# Patient Record
Sex: MALE | Race: WHITE | NOT HISPANIC OR LATINO | Employment: FULL TIME | URBAN - METROPOLITAN AREA
[De-identification: names, ages, dates, MRNs, and addresses within clinical notes are randomized per-mention and may not be internally consistent; named-entity substitution may affect disease eponyms.]

---

## 2019-07-28 RX ORDER — PREDNISONE 10 MG/1
TABLET ORAL
Qty: 20 TABLET | Refills: 0 | Status: SHIPPED | OUTPATIENT
Start: 2019-07-28 | End: 2019-08-05

## 2019-07-28 RX ORDER — CLOBETASOL PROPIONATE 0.5 MG/G
CREAM TOPICAL
Qty: 60 G | Refills: 1 | Status: SHIPPED | OUTPATIENT
Start: 2019-07-28 | End: 2023-01-26

## 2019-08-02 ENCOUNTER — OFFICE VISIT (OUTPATIENT)
Dept: FAMILY MEDICINE CLINIC | Facility: CLINIC | Age: 24
End: 2019-08-02

## 2019-08-02 VITALS
HEART RATE: 49 BPM | TEMPERATURE: 97.6 F | RESPIRATION RATE: 20 BRPM | SYSTOLIC BLOOD PRESSURE: 108 MMHG | BODY MASS INDEX: 22.26 KG/M2 | OXYGEN SATURATION: 98 % | DIASTOLIC BLOOD PRESSURE: 64 MMHG | WEIGHT: 155.5 LBS | HEIGHT: 70 IN

## 2019-08-02 DIAGNOSIS — Z83.2 FAMILY HISTORY OF CLOTTING DISORDER: Primary | ICD-10-CM

## 2019-08-02 DIAGNOSIS — L30.9 DERMATITIS: ICD-10-CM

## 2019-08-02 PROCEDURE — 99395 PREV VISIT EST AGE 18-39: CPT | Performed by: NURSE PRACTITIONER

## 2019-08-02 NOTE — PATIENT INSTRUCTIONS
Follow up on labs.   Continue benadryl for hives  May consider allergy testing  Can take zyrtec daily  Get flu shot.

## 2019-08-02 NOTE — PROGRESS NOTES
"Subjective   Price Borjas is a 24 y.o. male.     Chief Complaint   Patient presents with   • Annual Exam     [facility] insurance physical       HPI  He is here for his yearly physical exam.  He was recently treated for dermatitis vs allergic reaction to chemicals on his hands that responed to steroids and steroid cream. He took 3 days then developed a rash on his legs that appeared to be hives.   He said still has rash on legs that itches but otherwise is better.  He has concerns because his mother was diagnosed with clotting disorder and was recommended he be tested. Reviewed his mother's chart: she is factor V Leiden homozygosity , Low protein S and anticariolipin antibody positive.     The following portions of the patient's history were reviewed and updated as appropriate: allergies, current medications, past family history, past medical history, past social history, past surgical history and problem list.      Current Outpatient Medications:   •  clobetasol prop emollient base (TEMOVATE) 0.05 % emollient cream, Apply sparingly to hands nightly for 2 weeks then stop, Disp: 60 g, Rfl: 1  •  predniSONE (DELTASONE) 10 MG tablet, Take 4 tablets by mouth Daily for 2 days, THEN 3 tablets Daily for 2 days, THEN 2 tablets Daily for 2 days, THEN 1 tablet Daily for 2 days., Disp: 20 tablet, Rfl: 0    No results found for this or any previous visit (from the past 4032 hour(s)).      Review of Systems   Skin: Positive for rash.        Hands.   Gets hives has one his legs right now       Objective     /64 (BP Location: Left arm, Patient Position: Sitting, Cuff Size: Adult)   Pulse (!) 49   Temp 97.6 °F (36.4 °C) (Oral)   Resp 20   Ht 177.8 cm (70\")   Wt 70.5 kg (155 lb 8 oz)   SpO2 98%   BMI 22.31 kg/m²     Physical Exam   Constitutional: He is oriented to person, place, and time. He appears well-developed and well-nourished.   HENT:   Head: Normocephalic and atraumatic.   Right Ear: External ear normal.   Left Ear: " External ear normal.   Nose: Nose normal.   Mouth/Throat: Oropharynx is clear and moist.   Eyes: Conjunctivae and EOM are normal. Pupils are equal, round, and reactive to light.   Neck: Normal range of motion.   Cardiovascular: Normal rate, regular rhythm, normal heart sounds and intact distal pulses.   Pulmonary/Chest: Effort normal and breath sounds normal.   Abdominal: Soft. Bowel sounds are normal.   Neurological: He is alert and oriented to person, place, and time.   Skin: Skin is warm and dry.   Psychiatric: He has a normal mood and affect. His behavior is normal. Judgment and thought content normal.   Vitals reviewed.        Assessment/Plan   Price was seen today for annual exam.    Diagnoses and all orders for this visit:    Family history of clotting disorder  -     Factor 5 Leiden; Future  -     Protein S Functional; Future  -     Protein C Activity; Future  -     Factor II, DNA Analysis; Future  -     Von Willebrand Screen; Future  -     CBC & Differential; Future  -     Comprehensive Metabolic Panel; Future      Patient Instructions   Follow up on labs.   Continue benadryl for hives  May consider allergy testing  Can take zyrtec daily  Get flu shot.       Leydi Kim, APRN    08/02/19

## 2019-08-12 LAB
ALBUMIN SERPL-MCNC: 4.9 G/DL (ref 3.5–5.5)
ALBUMIN/GLOB SERPL: 2.5 {RATIO} (ref 1.2–2.2)
ALP SERPL-CCNC: 56 IU/L (ref 39–117)
ALT SERPL-CCNC: 41 IU/L (ref 0–44)
AMBIG ABBREV CMP14 DEFAULT: NORMAL
AST SERPL-CCNC: 42 IU/L (ref 0–40)
BASOPHILS # BLD AUTO: 0 X10E3/UL (ref 0–0.2)
BASOPHILS NFR BLD AUTO: 1 %
BILIRUB SERPL-MCNC: 0.5 MG/DL (ref 0–1.2)
BUN SERPL-MCNC: 15 MG/DL (ref 6–20)
BUN/CREAT SERPL: 14 (ref 9–20)
CALCIUM SERPL-MCNC: 9.6 MG/DL (ref 8.7–10.2)
CHLORIDE SERPL-SCNC: 103 MMOL/L (ref 96–106)
CO2 SERPL-SCNC: 25 MMOL/L (ref 20–29)
CREAT SERPL-MCNC: 1.04 MG/DL (ref 0.76–1.27)
EOSINOPHIL # BLD AUTO: 0.1 X10E3/UL (ref 0–0.4)
EOSINOPHIL NFR BLD AUTO: 1 %
ERYTHROCYTE [DISTWIDTH] IN BLOOD BY AUTOMATED COUNT: 12.9 % (ref 12.3–15.4)
F2 GENE MUT ANL BLD/T: NORMAL
F5 GENE MUT ANL BLD/T: ABNORMAL
FACT VIII ACT/NOR PPP: 116 % (ref 56–140)
GLOBULIN SER CALC-MCNC: 2 G/DL (ref 1.5–4.5)
GLUCOSE SERPL-MCNC: 94 MG/DL (ref 65–99)
HCT VFR BLD AUTO: 42.1 % (ref 37.5–51)
HGB BLD-MCNC: 14.7 G/DL (ref 13–17.7)
IMM GRANULOCYTES # BLD AUTO: 0 X10E3/UL (ref 0–0.1)
IMM GRANULOCYTES NFR BLD AUTO: 0 %
LYMPHOCYTES # BLD AUTO: 1.2 X10E3/UL (ref 0.7–3.1)
LYMPHOCYTES NFR BLD AUTO: 28 %
MCH RBC QN AUTO: 30.2 PG (ref 26.6–33)
MCHC RBC AUTO-ENTMCNC: 34.9 G/DL (ref 31.5–35.7)
MCV RBC AUTO: 87 FL (ref 79–97)
MONOCYTES # BLD AUTO: 0.5 X10E3/UL (ref 0.1–0.9)
MONOCYTES NFR BLD AUTO: 12 %
NEUTROPHILS # BLD AUTO: 2.4 X10E3/UL (ref 1.4–7)
NEUTROPHILS NFR BLD AUTO: 58 %
PATH INTERP BLD-IMP: NORMAL
PLATELET # BLD AUTO: 182 X10E3/UL (ref 150–450)
POTASSIUM SERPL-SCNC: 4.2 MMOL/L (ref 3.5–5.2)
PROT C ACT/NOR PPP CHRO: 140 %
PROT S ACT/NOR PPP: 65 % (ref 63–140)
PROT SERPL-MCNC: 6.9 G/DL (ref 6–8.5)
RBC # BLD AUTO: 4.86 X10E6/UL (ref 4.14–5.8)
SODIUM SERPL-SCNC: 143 MMOL/L (ref 134–144)
VWF AG ACT/NOR PPP IA: 163 % (ref 50–200)
VWF:RCO ACT/NOR PPP PL AGG: 143 % (ref 50–200)
WBC # BLD AUTO: 4.2 X10E3/UL (ref 3.4–10.8)

## 2019-08-13 ENCOUNTER — TELEPHONE (OUTPATIENT)
Dept: FAMILY MEDICINE CLINIC | Facility: CLINIC | Age: 24
End: 2019-08-13

## 2019-08-13 DIAGNOSIS — D68.51 FACTOR 5 LEIDEN MUTATION, HETEROZYGOUS (HCC): Primary | ICD-10-CM

## 2019-08-13 NOTE — TELEPHONE ENCOUNTER
Contacted patient with his lab results . Referred to hematologist for further evaluation of Factor 5 Leiden heterozygote

## 2019-09-16 PROBLEM — D68.51 FACTOR 5 LEIDEN MUTATION, HETEROZYGOUS: Status: ACTIVE | Noted: 2019-09-16

## 2019-09-16 NOTE — PROGRESS NOTES
Hematology/Oncology Outpatient Consultation    Patient name: Price Borjas  : 1995  MRN: [id number]  Primary Care Physician: Leydi Kim APRN  Referring Physician: Leydi Kim APRN  Reason For Consult: Factor V Leiden mutation, heterozygous     Chief Complaint   Patient presents with   • Appointment     for factor V Leiden mutation, heterozygous      Padma Curtis DEONTE present during office visit.      History of Present Illness:  This patient is a 24-year-old  male whose mother after hearing being developed lower extremity DVT and SVT was diagnosed with factor V Leiden heterozygosity, low protein S level and anticardiolipin antibody positivity which has since resolved.  She was asked to have her family members tested and the patient underwent evaluation by his PCP MILDRED Marie on 2019 when blood testing was performed revealing a normal CBC.  CMP was significant for AST 42 (0-40), ALT 41 (0-44).  Factor VIII activity 116% (), von Willebrand antigen 163% (), VWF activity 143 (), protein S functional 65 (), protein C activity 140 (), factor II DNA analysis with no mutation identified and factor V Leiden with a single R506Q mutation identified (heterozygous).  The patient was then referred here for further evaluation and today is denying ever have had any blood clots himself.  He is not a smoker and has not had any major surgical procedures performed or been on prolonged bedrest.    History reviewed. No pertinent past medical history.    Past Surgical History:   Procedure Laterality Date   • WRIST FRACTURE SURGERY Left 2018         Current Outpatient Medications:   •  clobetasol prop emollient base (TEMOVATE) 0.05 % emollient cream, Apply sparingly to hands nightly for 2 weeks then stop, Disp: 60 g, Rfl: 1    No Known Allergies      There is no immunization history on file for this patient.    Family History   Problem Relation Age of Onset   •  "Factor V Leiden deficiency Mother    • Cancer Paternal Grandmother 60        Multiple myeloma        Cancer-related family history includes Cancer (age of onset: 60) in his paternal grandmother.    Social History     Tobacco Use   • Smoking status: Never Smoker   • Smokeless tobacco: Never Used   Substance Use Topics   • Alcohol use: Yes     Frequency: 2-3 times a week     Drinks per session: 1 or 2     Binge frequency: Less than monthly     Comment: socially   • Drug use: No       ROS:    Review of Systems   Constitutional: Negative for chills and fever.   HENT: Negative for ear pain, mouth sores, nosebleeds and sore throat.    Eyes: Negative for photophobia and visual disturbance.   Respiratory: Negative for wheezing and stridor.    Cardiovascular: Negative for chest pain and palpitations.   Gastrointestinal: Negative for abdominal pain, diarrhea, nausea and vomiting.   Endocrine: Negative for cold intolerance and heat intolerance.   Genitourinary: Negative for dysuria and hematuria.   Musculoskeletal: Negative for joint swelling and neck stiffness.   Skin: Negative for color change and rash.   Neurological: Negative for seizures and syncope.   Hematological: Negative for adenopathy.        No obvious bleeding   Psychiatric/Behavioral: Negative for agitation, confusion and hallucinations.       Objective:    Vitals:    09/18/19 1258   BP: 118/62   Pulse: (!) 42  Comment: triple checked.   Resp: 18   Temp: 98.3 °F (36.8 °C)   Weight: 72.4 kg (159 lb 9.6 oz)   Height: 177.8 cm (70\")   PainSc: 0-No pain       ECOG  (0) Fully active, able to carry on all predisease performance without restriction    Physical Exam:    Physical Exam   Constitutional: He is oriented to person, place, and time. No distress.   HENT:   Head: Normocephalic and atraumatic.   Dental fillings. Tympanic membranes: cerumen bilaterally.    Eyes: Conjunctivae and EOM are normal. Right eye exhibits no discharge. Left eye exhibits no discharge. No " scleral icterus.   Neck: Normal range of motion. Neck supple. No thyromegaly present.   Cardiovascular: Normal rate, regular rhythm and normal heart sounds. Exam reveals no gallop and no friction rub.   Pulmonary/Chest: Effort normal. No stridor. No respiratory distress. He has no wheezes.   Abdominal: Soft. Bowel sounds are normal. He exhibits no mass. There is no tenderness. There is no rebound and no guarding.   Musculoskeletal: Normal range of motion. He exhibits no tenderness.   Lymphadenopathy:     He has no cervical adenopathy.   Neurological: He is alert and oriented to person, place, and time. He exhibits normal muscle tone.   Skin: Skin is warm. No rash noted. He is not diaphoretic. No erythema.   Psychiatric: He has a normal mood and affect. His behavior is normal.   Nursing note and vitals reviewed.      RECENT LABS  WBC   Date Value Ref Range Status   08/06/2019 4.2 3.4 - 10.8 x10E3/uL Final     RBC   Date Value Ref Range Status   08/06/2019 4.86 4.14 - 5.80 x10E6/uL Final     Hemoglobin   Date Value Ref Range Status   08/06/2019 14.7 13.0 - 17.7 g/dL Final     Hematocrit   Date Value Ref Range Status   08/06/2019 42.1 37.5 - 51.0 % Final     MCV   Date Value Ref Range Status   08/06/2019 87 79 - 97 fL Final     MCH   Date Value Ref Range Status   08/06/2019 30.2 26.6 - 33.0 pg Final     MCHC   Date Value Ref Range Status   08/06/2019 34.9 31.5 - 35.7 g/dL Final     RDW   Date Value Ref Range Status   08/06/2019 12.9 12.3 - 15.4 % Final     Platelets   Date Value Ref Range Status   08/06/2019 182 150 - 450 x10E3/uL Final     Neutrophil Rel %   Date Value Ref Range Status   08/06/2019 58 Not Estab. % Final     Lymphocyte Rel %   Date Value Ref Range Status   08/06/2019 28 Not Estab. % Final     Monocyte Rel %   Date Value Ref Range Status   08/06/2019 12 Not Estab. % Final     Eosinophil Rel %   Date Value Ref Range Status   08/06/2019 1 Not Estab. % Final     Basophil Rel %   Date Value Ref Range Status    08/06/2019 1 Not Estab. % Final     Neutrophils Absolute   Date Value Ref Range Status   08/06/2019 2.4 1.4 - 7.0 x10E3/uL Final     Lymphocytes Absolute   Date Value Ref Range Status   08/06/2019 1.2 0.7 - 3.1 x10E3/uL Final     Monocytes Absolute   Date Value Ref Range Status   08/06/2019 0.5 0.1 - 0.9 x10E3/uL Final     Eosinophils Absolute   Date Value Ref Range Status   08/06/2019 0.1 0.0 - 0.4 x10E3/uL Final     Basophils Absolute   Date Value Ref Range Status   08/06/2019 0.0 0.0 - 0.2 x10E3/uL Final       Lab Results   Component Value Date    BUN 15 08/06/2019    CREATININE 1.04 08/06/2019    EGFRIFNONA 100 08/06/2019    EGFRIFAFRI 116 08/06/2019    BCR 14 08/06/2019    K 4.2 08/06/2019    CO2 25 08/06/2019    CALCIUM 9.6 08/06/2019    PROTENTOTREF 6.9 08/06/2019    ALBUMIN 4.9 08/06/2019    LABIL2 2.5 (H) 08/06/2019    AST 42 (H) 08/06/2019    ALT 41 08/06/2019         Assessment/Plan     Factor 5 Leiden mutation, heterozygous (CMS/HCC)  - [facility] Requisition ([facility] Only)  - CBC & Differential    Elevated LFTs  - Comprehensive Metabolic Panel  In summary this is a patient with family history of factor V Leiden homozygosity and low protein S level who himself is heterozygous for the Factor 5 Leiden and has borderline protein S level.  His mother also has a history of antiphospholipid antibody positivity.  I have discussed this in detail with him and have told him that I would like to complete his thrombophilia work-up by checking Antithrombin III, lupus anticoagulant, antiphospholipid antibodies and repeating protein S level.  He will likely benefit from being on treatment with aspirin depending on further work-up results and I have made him aware of increased risks of clotting with prolonged bedrest or smoking.  He did have elevated liver enzymes a month ago and I will be repeating those to see if they need any further work-up.        I have reviewed lab results, imaging, vitals, and medications with the  patient today. Will follow up in 1 month.    Patient verbalized understanding and is in agreement of the above plan.    I have reviewed and agree with the above information.   Dilip Arias M.D., F.A.C.P.      Much of the above report is an electronic transcription/translation of the spoken language to printed text using Dragon Software. As such, the subtleties and finesse of the spoken language may permit erroneous, or at times, nonsensical words or phrases to be inadvertently transcribed; thus changes may be made at a later date to rectify these errors.

## 2019-09-18 ENCOUNTER — RESULTS ENCOUNTER (OUTPATIENT)
Dept: ONCOLOGY | Facility: CLINIC | Age: 24
End: 2019-09-18

## 2019-09-18 ENCOUNTER — CONSULT (OUTPATIENT)
Dept: ONCOLOGY | Facility: CLINIC | Age: 24
End: 2019-09-18

## 2019-09-18 ENCOUNTER — APPOINTMENT (OUTPATIENT)
Dept: LAB | Facility: HOSPITAL | Age: 24
End: 2019-09-18

## 2019-09-18 VITALS
HEIGHT: 70 IN | WEIGHT: 159.6 LBS | DIASTOLIC BLOOD PRESSURE: 62 MMHG | BODY MASS INDEX: 22.85 KG/M2 | TEMPERATURE: 98.3 F | SYSTOLIC BLOOD PRESSURE: 118 MMHG | RESPIRATION RATE: 18 BRPM | HEART RATE: 42 BPM

## 2019-09-18 DIAGNOSIS — D68.51 FACTOR 5 LEIDEN MUTATION, HETEROZYGOUS (HCC): Primary | ICD-10-CM

## 2019-09-18 DIAGNOSIS — R79.89 ELEVATED LFTS: ICD-10-CM

## 2019-09-18 DIAGNOSIS — D68.51 FACTOR 5 LEIDEN MUTATION, HETEROZYGOUS (HCC): ICD-10-CM

## 2019-09-18 LAB
ALBUMIN SERPL-MCNC: 4.9 G/DL (ref 3.5–4.8)
ALBUMIN/GLOB SERPL: 2 G/DL (ref 1–1.7)
ALP SERPL-CCNC: 50 U/L (ref 32–91)
ALT SERPL W P-5'-P-CCNC: 45 U/L (ref 17–63)
ANION GAP SERPL CALCULATED.3IONS-SCNC: 17.8 MMOL/L (ref 5–15)
AST SERPL-CCNC: 47 U/L (ref 15–41)
BASOPHILS # BLD AUTO: 0.01 10*3/MM3 (ref 0–0.2)
BASOPHILS NFR BLD AUTO: 0.2 % (ref 0–1.5)
BILIRUB SERPL-MCNC: 1.1 MG/DL (ref 0.3–1.2)
BUN BLD-MCNC: 9 MG/DL (ref 8–20)
BUN/CREAT SERPL: 9 (ref 6.2–20.3)
CALCIUM SPEC-SCNC: 9.5 MG/DL (ref 8.9–10.3)
CHLORIDE SERPL-SCNC: 97 MMOL/L (ref 101–111)
CO2 SERPL-SCNC: 27 MMOL/L (ref 22–32)
CREAT BLD-MCNC: 1 MG/DL (ref 0.7–1.2)
DEPRECATED RDW RBC AUTO: 38.9 FL (ref 37–54)
EOSINOPHIL # BLD AUTO: 0.07 10*3/MM3 (ref 0–0.4)
EOSINOPHIL NFR BLD AUTO: 1.1 % (ref 0.3–6.2)
ERYTHROCYTE [DISTWIDTH] IN BLOOD BY AUTOMATED COUNT: 12.6 % (ref 12.3–15.4)
GFR SERPL CREATININE-BSD FRML MDRD: 92 ML/MIN/1.73
GLOBULIN UR ELPH-MCNC: 2.4 GM/DL (ref 2.5–3.8)
GLUCOSE BLD-MCNC: 103 MG/DL (ref 65–99)
HCT VFR BLD AUTO: 43.8 % (ref 37.5–51)
HGB BLD-MCNC: 15.3 G/DL (ref 13–17.7)
LYMPHOCYTES # BLD AUTO: 1.55 10*3/MM3 (ref 0.7–3.1)
LYMPHOCYTES NFR BLD AUTO: 24.8 % (ref 19.6–45.3)
MCH RBC QN AUTO: 30.4 PG (ref 26.6–33)
MCHC RBC AUTO-ENTMCNC: 34.9 G/DL (ref 31.5–35.7)
MCV RBC AUTO: 87.1 FL (ref 79–97)
MONOCYTES # BLD AUTO: 0.76 10*3/MM3 (ref 0.1–0.9)
MONOCYTES NFR BLD AUTO: 12.2 % (ref 5–12)
NEUTROPHILS # BLD AUTO: 3.86 10*3/MM3 (ref 1.7–7)
NEUTROPHILS NFR BLD AUTO: 61.7 % (ref 42.7–76)
PLATELET # BLD AUTO: 192 10*3/MM3 (ref 140–450)
PMV BLD AUTO: 10.6 FL (ref 6–12)
POTASSIUM BLD-SCNC: 3.8 MMOL/L (ref 3.6–5.1)
PROT SERPL-MCNC: 7.3 G/DL (ref 6.1–7.9)
RBC # BLD AUTO: 5.03 10*6/MM3 (ref 4.14–5.8)
SODIUM BLD-SCNC: 138 MMOL/L (ref 136–144)
WBC NRBC COR # BLD: 6.25 10*3/MM3 (ref 3.4–10.8)

## 2019-09-18 PROCEDURE — 85025 COMPLETE CBC W/AUTO DIFF WBC: CPT | Performed by: INTERNAL MEDICINE

## 2019-09-18 PROCEDURE — 99204 OFFICE O/P NEW MOD 45 MIN: CPT | Performed by: INTERNAL MEDICINE

## 2019-09-18 PROCEDURE — 80053 COMPREHEN METABOLIC PANEL: CPT | Performed by: INTERNAL MEDICINE

## 2019-09-18 PROCEDURE — 36415 COLL VENOUS BLD VENIPUNCTURE: CPT | Performed by: INTERNAL MEDICINE

## 2019-10-14 NOTE — PROGRESS NOTES
Hematology/Oncology Outpatient Follow Up    PATIENT NAME:Price Borjas  :1995  MRN: [id number]  PRIMARY CARE PHYSICIAN: Leydi Kim APRN  REFERRING PHYSICIAN: Leydi Kim APRN    Chief Complaint   Patient presents with   • Follow-up     factor V Leiden mutation        HISTORY OF PRESENT ILLNESS:   1. Factor V Leiden heterozygosity diagnosed 2019  · This patient is a 24-year-old  male whose mother after having developed lower extremity DVT and SVT was diagnosed with factor V Leiden heterozygosity, low protein S level and anticardiolipin antibody positivity which has since resolved.  She was asked to have her family members tested and the patient underwent evaluation by his PCP MILDRED Marie on 2019 when blood testing was performed revealing a normal CBC.  CMP was significant for AST 42 (0-40), ALT 41 (0-44).  Factor VIII activity 116% (), von Willebrand antigen 163% (), VWF activity 143 (), protein S functional 65 (), protein C activity 140 (), factor II DNA analysis with no mutation identified and factor V Leiden with a single R506Q mutation identified (heterozygous).  The patient was then referred here for further evaluation and denied ever having any blood clots himself.  He is not a smoker and has not had any major surgical procedures performed or been on prolonged bedrest.   · 2019 patient seen in initial consultation at the cancer center for factor V Leiden heterozygosity on referral by MILDRED Marie.  WBC 6.25 (3.40-10.8). Hgb 15.3 (13-17.7) Platelets 192 (140-450). Antiphospholipid antibodies were negative.  Lupus inhibitor screen was inconclusive.  Antithrombin III activity 115 (), protein S activity 138 ().  ALT 45 (17-63), AST 47 (15-41).    History reviewed. No pertinent past medical history.    Past Surgical History:   Procedure Laterality Date   • WRIST FRACTURE SURGERY Left 2018         Current Outpatient  Medications:   •  aspirin 81 MG tablet, Take 1 tablet by mouth Daily., Disp: 90 tablet, Rfl: 4  •  clobetasol prop emollient base (TEMOVATE) 0.05 % emollient cream, Apply sparingly to hands nightly for 2 weeks then stop, Disp: 60 g, Rfl: 1    No Known Allergies    Family History   Problem Relation Age of Onset   • Factor V Leiden deficiency Mother    • Cancer Paternal Grandmother 60        Multiple myeloma        Cancer-related family history includes Cancer (age of onset: 60) in his paternal grandmother.    Social History     Tobacco Use   • Smoking status: Never Smoker   • Smokeless tobacco: Never Used   Substance Use Topics   • Alcohol use: Yes     Frequency: 2-3 times a week     Drinks per session: 1 or 2     Binge frequency: Less than monthly     Comment: socially   • Drug use: No       I have reviewed the history of present illness, past medical history, family history, social history, lab results, all notes and other records since the patient was last seen on 9/18/2019.    SUBJECTIVE: Patient is here to follow up on factor V Leiden mutation.       DEONTE Vega present during office visit.     REVIEW OF SYSTEMS:  Review of Systems   Constitutional: Negative for chills and fever.   HENT: Negative for ear pain, mouth sores, nosebleeds and sore throat.    Eyes: Negative for photophobia and visual disturbance.   Respiratory: Negative for wheezing and stridor.    Cardiovascular: Negative for chest pain and palpitations.   Gastrointestinal: Negative for abdominal pain, diarrhea, nausea and vomiting.   Endocrine: Negative for cold intolerance and heat intolerance.   Genitourinary: Negative for dysuria and hematuria.   Musculoskeletal: Negative for joint swelling and neck stiffness.   Skin: Negative for color change and rash.   Neurological: Negative for seizures and syncope.   Hematological: Negative for adenopathy.        No obvious bleeding   Psychiatric/Behavioral: Negative for agitation, confusion and  "hallucinations.       OBJECTIVE:    Vitals:    10/16/19 0852   BP: 111/76   Pulse: 63   Resp: 18   Temp: 97.4 °F (36.3 °C)   Weight: 70.8 kg (156 lb)   Height: 177.8 cm (70\")   PainSc: 0-No pain       ECOG  (0) Fully active, able to carry on all predisease performance without restriction    Physical Exam   Constitutional: He is oriented to person, place, and time. No distress.   HENT:   Head: Normocephalic and atraumatic.   Dental filings   Eyes: Conjunctivae and EOM are normal. Right eye exhibits no discharge. Left eye exhibits no discharge. No scleral icterus.   Neck: Normal range of motion. Neck supple. No thyromegaly present.   Cardiovascular: Normal rate, regular rhythm and normal heart sounds. Exam reveals no gallop and no friction rub.   Pulmonary/Chest: Effort normal. No stridor. No respiratory distress. He has no wheezes.   Abdominal: Soft. Bowel sounds are normal. He exhibits no mass. There is no tenderness. There is no rebound and no guarding.   Musculoskeletal: Normal range of motion. He exhibits no tenderness.   Lymphadenopathy:     He has no cervical adenopathy.   Neurological: He is alert and oriented to person, place, and time. He exhibits normal muscle tone.   Skin: Skin is warm. No rash noted. He is not diaphoretic. No erythema.   Psychiatric: He has a normal mood and affect. His behavior is normal.   Nursing note and vitals reviewed.      RECENT LABS  WBC   Date Value Ref Range Status   10/16/2019 5.50 3.40 - 10.80 10*3/mm3 Final   08/06/2019 4.2 3.4 - 10.8 x10E3/uL Final     RBC   Date Value Ref Range Status   10/16/2019 4.75 4.14 - 5.80 10*6/mm3 Final   08/06/2019 4.86 4.14 - 5.80 x10E6/uL Final     Hemoglobin   Date Value Ref Range Status   10/16/2019 14.4 13.0 - 17.7 g/dL Final     Hematocrit   Date Value Ref Range Status   10/16/2019 40.8 37.5 - 51.0 % Final     MCV   Date Value Ref Range Status   10/16/2019 85.9 79.0 - 97.0 fL Final     MCH   Date Value Ref Range Status   10/16/2019 30.3 " 26.6 - 33.0 pg Final     MCHC   Date Value Ref Range Status   10/16/2019 35.3 31.5 - 35.7 g/dL Final     RDW   Date Value Ref Range Status   10/16/2019 12.7 12.3 - 15.4 % Final     RDW-SD   Date Value Ref Range Status   10/16/2019 38.8 37.0 - 54.0 fl Final     MPV   Date Value Ref Range Status   10/16/2019 10.4 6.0 - 12.0 fL Final     Platelets   Date Value Ref Range Status   10/16/2019 148 140 - 450 10*3/mm3 Final     Neutrophil %   Date Value Ref Range Status   10/16/2019 60.1 42.7 - 76.0 % Final     Lymphocyte %   Date Value Ref Range Status   10/16/2019 24.2 19.6 - 45.3 % Final     Monocyte %   Date Value Ref Range Status   10/16/2019 13.5 (H) 5.0 - 12.0 % Final     Eosinophil %   Date Value Ref Range Status   10/16/2019 2.0 0.3 - 6.2 % Final     Basophil %   Date Value Ref Range Status   10/16/2019 0.2 0.0 - 1.5 % Final     Neutrophils, Absolute   Date Value Ref Range Status   10/16/2019 3.31 1.70 - 7.00 10*3/mm3 Final     Lymphocytes, Absolute   Date Value Ref Range Status   10/16/2019 1.33 0.70 - 3.10 10*3/mm3 Final     Monocytes, Absolute   Date Value Ref Range Status   10/16/2019 0.74 0.10 - 0.90 10*3/mm3 Final     Eosinophils, Absolute   Date Value Ref Range Status   10/16/2019 0.11 0.00 - 0.40 10*3/mm3 Final     Basophils, Absolute   Date Value Ref Range Status   10/16/2019 0.01 0.00 - 0.20 10*3/mm3 Final       Lab Results   Component Value Date    GLUCOSE 103 (H) 09/18/2019    BUN 9 09/18/2019    CREATININE 1.00 09/18/2019    EGFRIFNONA 92 09/18/2019    EGFRIFAFRI 116 08/06/2019    BCR 9.0 09/18/2019    K 3.8 09/18/2019    CO2 27.0 09/18/2019    CALCIUM 9.5 09/18/2019    PROTENTOTREF 6.9 08/06/2019    ALBUMIN 4.90 (H) 09/18/2019    LABIL2 2.5 (H) 08/06/2019    AST 47 (H) 09/18/2019    ALT 45 09/18/2019         Assessment/Plan     Factor 5 Leiden mutation, heterozygous (CMS/HCC)  - CBC & Differential  - CBC Auto Differential  - aspirin 81 MG tablet    Lupus anticoagulant positive  - aspirin 81 MG  tablet  - [facility] Requisition ([facility] Only)    Elevated LFTs  - Ambulatory Referral to Gastroenterology      ASSESSMENT:  The patient claims to be feeling all right has not started on aspirin yet.  I made him aware of work-up results so far with lupus anticoagulant possibly being positive which would need to be repeated.  I have recommended that he start on aspirin 81 mg p.o. daily with use of stronger anticoagulants for prolonged bedrest.  His transaminases are also borderline elevated for which I have recommended evaluation by [facility].      PLAN:  Start 81 mg ASA daily.   Will consult [facility] for elevated LFT's.   Lupus anticoagulant next visit.         I have reviewed labs results, imaging, vitals, and medications with the patient today. Will follow up in three months with a CBC.     Patient verbalized understanding and is in agreement of the above plan.    I have reviewed and validated the information above.   Dilip Arias M.D., F.A.C.P.    Much of the above report is an electronic transcription/translation of the spoken language to printed text using Dragon Software. As such, the subtleties and finesse of the spoken language may permit erroneous, or at times, nonsensical words or phrases to be inadvertently transcribed; thus changes may be made at a later date to rectify these errors.

## 2019-10-16 ENCOUNTER — APPOINTMENT (OUTPATIENT)
Dept: LAB | Facility: HOSPITAL | Age: 24
End: 2019-10-16

## 2019-10-16 ENCOUNTER — OFFICE VISIT (OUTPATIENT)
Dept: ONCOLOGY | Facility: CLINIC | Age: 24
End: 2019-10-16

## 2019-10-16 VITALS
DIASTOLIC BLOOD PRESSURE: 76 MMHG | WEIGHT: 156 LBS | BODY MASS INDEX: 22.33 KG/M2 | SYSTOLIC BLOOD PRESSURE: 111 MMHG | RESPIRATION RATE: 18 BRPM | TEMPERATURE: 97.4 F | HEART RATE: 63 BPM | HEIGHT: 70 IN

## 2019-10-16 DIAGNOSIS — R79.89 ELEVATED LFTS: ICD-10-CM

## 2019-10-16 DIAGNOSIS — R76.0 LUPUS ANTICOAGULANT POSITIVE: ICD-10-CM

## 2019-10-16 DIAGNOSIS — D68.51 FACTOR 5 LEIDEN MUTATION, HETEROZYGOUS (HCC): Primary | ICD-10-CM

## 2019-10-16 LAB
BASOPHILS # BLD AUTO: 0.01 10*3/MM3 (ref 0–0.2)
BASOPHILS NFR BLD AUTO: 0.2 % (ref 0–1.5)
DEPRECATED RDW RBC AUTO: 38.8 FL (ref 37–54)
EOSINOPHIL # BLD AUTO: 0.11 10*3/MM3 (ref 0–0.4)
EOSINOPHIL NFR BLD AUTO: 2 % (ref 0.3–6.2)
ERYTHROCYTE [DISTWIDTH] IN BLOOD BY AUTOMATED COUNT: 12.7 % (ref 12.3–15.4)
HCT VFR BLD AUTO: 40.8 % (ref 37.5–51)
HGB BLD-MCNC: 14.4 G/DL (ref 13–17.7)
LYMPHOCYTES # BLD AUTO: 1.33 10*3/MM3 (ref 0.7–3.1)
LYMPHOCYTES NFR BLD AUTO: 24.2 % (ref 19.6–45.3)
MCH RBC QN AUTO: 30.3 PG (ref 26.6–33)
MCHC RBC AUTO-ENTMCNC: 35.3 G/DL (ref 31.5–35.7)
MCV RBC AUTO: 85.9 FL (ref 79–97)
MONOCYTES # BLD AUTO: 0.74 10*3/MM3 (ref 0.1–0.9)
MONOCYTES NFR BLD AUTO: 13.5 % (ref 5–12)
NEUTROPHILS # BLD AUTO: 3.31 10*3/MM3 (ref 1.7–7)
NEUTROPHILS NFR BLD AUTO: 60.1 % (ref 42.7–76)
PLATELET # BLD AUTO: 148 10*3/MM3 (ref 140–450)
PMV BLD AUTO: 10.4 FL (ref 6–12)
RBC # BLD AUTO: 4.75 10*6/MM3 (ref 4.14–5.8)
WBC NRBC COR # BLD: 5.5 10*3/MM3 (ref 3.4–10.8)

## 2019-10-16 PROCEDURE — 36415 COLL VENOUS BLD VENIPUNCTURE: CPT | Performed by: INTERNAL MEDICINE

## 2019-10-16 PROCEDURE — 99214 OFFICE O/P EST MOD 30 MIN: CPT | Performed by: INTERNAL MEDICINE

## 2019-10-16 PROCEDURE — 85025 COMPLETE CBC W/AUTO DIFF WBC: CPT | Performed by: INTERNAL MEDICINE

## 2019-10-21 ENCOUNTER — RESULTS ENCOUNTER (OUTPATIENT)
Dept: ONCOLOGY | Facility: CLINIC | Age: 24
End: 2019-10-21

## 2019-10-21 DIAGNOSIS — R76.0 LUPUS ANTICOAGULANT POSITIVE: ICD-10-CM

## 2021-05-10 ENCOUNTER — LAB (OUTPATIENT)
Dept: LAB | Facility: HOSPITAL | Age: 26
End: 2021-05-10

## 2021-05-10 ENCOUNTER — OFFICE VISIT (OUTPATIENT)
Dept: FAMILY MEDICINE CLINIC | Facility: CLINIC | Age: 26
End: 2021-05-10

## 2021-05-10 ENCOUNTER — HOSPITAL ENCOUNTER (OUTPATIENT)
Dept: GENERAL RADIOLOGY | Facility: HOSPITAL | Age: 26
Discharge: HOME | End: 2021-05-10

## 2021-05-10 VITALS
DIASTOLIC BLOOD PRESSURE: 73 MMHG | BODY MASS INDEX: 24.39 KG/M2 | SYSTOLIC BLOOD PRESSURE: 124 MMHG | TEMPERATURE: 96.6 F | HEART RATE: 69 BPM | HEIGHT: 70 IN | WEIGHT: 170.4 LBS | OXYGEN SATURATION: 97 %

## 2021-05-10 DIAGNOSIS — R79.89 ELEVATED LFTS: ICD-10-CM

## 2021-05-10 DIAGNOSIS — Z83.2 FAMILY HISTORY OF CLOTTING DISORDER: ICD-10-CM

## 2021-05-10 DIAGNOSIS — R76.0 LUPUS ANTICOAGULANT POSITIVE: ICD-10-CM

## 2021-05-10 DIAGNOSIS — M54.50 ACUTE BILATERAL LOW BACK PAIN WITHOUT SCIATICA: ICD-10-CM

## 2021-05-10 DIAGNOSIS — R76.0 LUPUS ANTICOAGULANT POSITIVE: Primary | ICD-10-CM

## 2021-05-10 LAB
ALBUMIN SERPL-MCNC: 4.8 G/DL (ref 3.5–5.2)
ALBUMIN/GLOB SERPL: 2.2 G/DL
ALP SERPL-CCNC: 55 U/L (ref 39–117)
ALT SERPL W P-5'-P-CCNC: 62 U/L (ref 1–41)
ANION GAP SERPL CALCULATED.3IONS-SCNC: 10.6 MMOL/L (ref 5–15)
AST SERPL-CCNC: 47 U/L (ref 1–40)
BASOPHILS # BLD AUTO: 0.04 10*3/MM3 (ref 0–0.2)
BASOPHILS NFR BLD AUTO: 0.7 % (ref 0–1.5)
BILIRUB SERPL-MCNC: 0.5 MG/DL (ref 0–1.2)
BUN SERPL-MCNC: 16 MG/DL (ref 6–20)
BUN/CREAT SERPL: 15.8 (ref 7–25)
CALCIUM SPEC-SCNC: 9.2 MG/DL (ref 8.6–10.5)
CHLORIDE SERPL-SCNC: 100 MMOL/L (ref 98–107)
CO2 SERPL-SCNC: 29.4 MMOL/L (ref 22–29)
CREAT SERPL-MCNC: 1.01 MG/DL (ref 0.76–1.27)
DEPRECATED RDW RBC AUTO: 39.3 FL (ref 37–54)
EOSINOPHIL # BLD AUTO: 0.26 10*3/MM3 (ref 0–0.4)
EOSINOPHIL NFR BLD AUTO: 4.5 % (ref 0.3–6.2)
ERYTHROCYTE [DISTWIDTH] IN BLOOD BY AUTOMATED COUNT: 12.1 % (ref 12.3–15.4)
GFR SERPL CREATININE-BSD FRML MDRD: 90 ML/MIN/1.73
GLOBULIN UR ELPH-MCNC: 2.2 GM/DL
GLUCOSE SERPL-MCNC: 127 MG/DL (ref 65–99)
HCT VFR BLD AUTO: 46.6 % (ref 37.5–51)
HGB BLD-MCNC: 16.1 G/DL (ref 13–17.7)
IMM GRANULOCYTES # BLD AUTO: 0.01 10*3/MM3 (ref 0–0.05)
IMM GRANULOCYTES NFR BLD AUTO: 0.2 % (ref 0–0.5)
LYMPHOCYTES # BLD AUTO: 1.11 10*3/MM3 (ref 0.7–3.1)
LYMPHOCYTES NFR BLD AUTO: 19.2 % (ref 19.6–45.3)
MCH RBC QN AUTO: 30.8 PG (ref 26.6–33)
MCHC RBC AUTO-ENTMCNC: 34.5 G/DL (ref 31.5–35.7)
MCV RBC AUTO: 89.3 FL (ref 79–97)
MONOCYTES # BLD AUTO: 0.46 10*3/MM3 (ref 0.1–0.9)
MONOCYTES NFR BLD AUTO: 8 % (ref 5–12)
NEUTROPHILS NFR BLD AUTO: 3.89 10*3/MM3 (ref 1.7–7)
NEUTROPHILS NFR BLD AUTO: 67.4 % (ref 42.7–76)
NRBC BLD AUTO-RTO: 0 /100 WBC (ref 0–0.2)
PLATELET # BLD AUTO: 193 10*3/MM3 (ref 140–450)
PMV BLD AUTO: 10.8 FL (ref 6–12)
POTASSIUM SERPL-SCNC: 3.9 MMOL/L (ref 3.5–5.2)
PROT SERPL-MCNC: 7 G/DL (ref 6–8.5)
RBC # BLD AUTO: 5.22 10*6/MM3 (ref 4.14–5.8)
SODIUM SERPL-SCNC: 140 MMOL/L (ref 136–145)
WBC # BLD AUTO: 5.77 10*3/MM3 (ref 3.4–10.8)

## 2021-05-10 PROCEDURE — 72114 X-RAY EXAM L-S SPINE BENDING: CPT

## 2021-05-10 PROCEDURE — 36415 COLL VENOUS BLD VENIPUNCTURE: CPT

## 2021-05-10 PROCEDURE — 85732 THROMBOPLASTIN TIME PARTIAL: CPT

## 2021-05-10 PROCEDURE — 80053 COMPREHEN METABOLIC PANEL: CPT

## 2021-05-10 PROCEDURE — 85670 THROMBIN TIME PLASMA: CPT

## 2021-05-10 PROCEDURE — 85302 CLOT INHIBIT PROT C ANTIGEN: CPT

## 2021-05-10 PROCEDURE — 85306 CLOT INHIBIT PROT S FREE: CPT

## 2021-05-10 PROCEDURE — 99214 OFFICE O/P EST MOD 30 MIN: CPT | Performed by: NURSE PRACTITIONER

## 2021-05-10 PROCEDURE — 85613 RUSSELL VIPER VENOM DILUTED: CPT

## 2021-05-10 PROCEDURE — 85025 COMPLETE CBC W/AUTO DIFF WBC: CPT

## 2021-05-10 PROCEDURE — 85305 CLOT INHIBIT PROT S TOTAL: CPT

## 2021-05-10 PROCEDURE — 85705 THROMBOPLASTIN INHIBITION: CPT

## 2021-05-10 RX ORDER — FEXOFENADINE HCL 180 MG/1
180 TABLET ORAL DAILY
COMMUNITY

## 2021-05-10 RX ORDER — MELOXICAM 7.5 MG/1
7.5 TABLET ORAL DAILY
Qty: 30 TABLET | Refills: 0 | Status: SHIPPED | OUTPATIENT
Start: 2021-05-10 | End: 2023-01-26 | Stop reason: ALTCHOICE

## 2021-05-10 NOTE — PROGRESS NOTES
"Subjective        Price Borjas is a 25 y.o. male.     Chief Complaint   Patient presents with   • Back Pain     low back pain x2 weeks       History of Present Illness  Patient is here for low back pain for 2 weeks. Low back. 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   Denies bowel or bladder incontinence, no constipation. He has used heat he does stretch.  He reports non radiating. No blood in urine no burning with urination.   No groin pain.   He is taking ibuprofen.   No weakness. Pain happens with position changes. Pain scale 5-6 reaching down to put on socks makes it worse.       The following portions of the patient's history were reviewed and updated as appropriate: allergies, current medications, past family history, past medical history, past social history, past surgical history and problem list.      Current Outpatient Medications:   •  aspirin 81 MG tablet, Take 1 tablet by mouth Daily., Disp: 90 tablet, Rfl: 4  •  fexofenadine (ALLEGRA) 180 MG tablet, Take 180 mg by mouth Daily., Disp: , Rfl:   •  clobetasol prop emollient base (TEMOVATE) 0.05 % emollient cream, Apply sparingly to hands nightly for 2 weeks then stop, Disp: 60 g, Rfl: 1  •  meloxicam (Mobic) 7.5 MG tablet, Take 1 tablet by mouth Daily., Disp: 30 tablet, Rfl: 0    No results found for this or any previous visit (from the past 4032 hour(s)).      Review of Systems    Objective     /73 (BP Location: Right arm, Patient Position: Sitting, Cuff Size: Adult)   Pulse 69   Temp 96.6 °F (35.9 °C) (Infrared)   Ht 177.8 cm (70\")   Wt 77.3 kg (170 lb 6.4 oz)   SpO2 97%   BMI 24.45 kg/m²     Physical Exam  Vitals and nursing note reviewed.   Constitutional:       General: He is not in acute distress.  HENT:      Head: Normocephalic.   Eyes:      Pupils: Pupils are equal, round, and reactive to light.   Cardiovascular:      Rate and Rhythm: Normal rate.      Pulses: Normal pulses.      Heart sounds: Normal " "heart sounds. No murmur heard.     Pulmonary:      Effort: Pulmonary effort is normal. No respiratory distress.      Breath sounds: Normal breath sounds.   Abdominal:      General: Bowel sounds are normal.      Palpations: Abdomen is soft.      Tenderness: There is no abdominal tenderness.   Musculoskeletal:      Cervical back: Normal and normal range of motion.      Thoracic back: Normal.      Lumbar back: No signs of trauma, spasms, tenderness or bony tenderness. Decreased range of motion. Positive right straight leg raise test and positive left straight leg raise test.        Back:       Right hip: Normal. No tenderness.      Left hip: Normal. No tenderness.      Right upper leg: No deformity.      Left upper leg: Normal. No deformity.      Comments: Reports \"tightness\" with movement    Skin:     General: Skin is warm and dry.      Findings: No bruising.   Neurological:      General: No focal deficit present.      Mental Status: He is alert.      Deep Tendon Reflexes: Reflexes normal.   Psychiatric:         Mood and Affect: Mood normal.         Behavior: Behavior normal.         Thought Content: Thought content normal.         Judgment: Judgment normal.         Result Review :                Assessment/Plan    Diagnoses and all orders for this visit:    1. Lupus anticoagulant positive (Primary)  Comments:  labs ordered today   Orders:  -     Lupus Anticoagulant; Future  -     Protein C & S Antigens; Future    2. Elevated LFTs  Comments:  labs ordered today   Orders:  -     Comprehensive Metabolic Panel; Future    3. Family history of clotting disorder  Comments:  labs ordered today   Orders:  -     Protein C & S Antigens; Future    4. Acute bilateral low back pain without sciatica  Comments:  Xray, new medication Meloxicam, referral to physical therapy   Orders:  -     CBC & Differential; Future  -     XR Spine Lumbar Complete With Flex & Ext; Future  -     Ambulatory Referral to Physical Therapy Evaluate and " treat    Other orders  -     meloxicam (Mobic) 7.5 MG tablet; Take 1 tablet by mouth Daily.  Dispense: 30 tablet; Refill: 0      Patient Instructions   Get labwork done today, follow up  Get X-rays done, follow up on results   Get new shoes   Continue low back exercises, heat and ice 20 minutes at a time as needed  Consider massage   Do not take ibuprofen or aleve with Meloxicam, can take tylenol in between doses as needed   Call or message for follow up plan        Acute Back Pain, Adult  Acute back pain is sudden and usually short-lived. It is often caused by an injury to the muscles and tissues in the back. The injury may result from:  · A muscle or ligament getting overstretched or torn (strained). Ligaments are tissues that connect bones to each other. Lifting something improperly can cause a back strain.  · Wear and tear (degeneration) of the spinal disks. Spinal disks are circular tissue that provide cushioning between the bones of the spine (vertebrae).  · Twisting motions, such as while playing sports or doing yard work.  · A hit to the back.  · Arthritis.  You may have a physical exam, lab tests, and imaging tests to find the cause of your pain. Acute back pain usually goes away with rest and home care.  Follow these instructions at home:  Managing pain, stiffness, and swelling  · Treatment may include medicines for pain and inflammation that are taken by mouth or applied to the skin, prescription pain medicine, or muscle relaxants. Take over-the-counter and prescription medicines only as told by your health care provider.  · Your health care provider may recommend applying ice during the first 24-48 hours after your pain starts. To do this:  ? Put ice in a plastic bag.  ? Place a towel between your skin and the bag.  ? Leave the ice on for 20 minutes, 2-3 times a day.  · If directed, apply heat to the affected area as often as told by your health care provider. Use the heat source that your health care  provider recommends, such as a moist heat pack or a heating pad.  ? Place a towel between your skin and the heat source.  ? Leave the heat on for 20-30 minutes.  ? Remove the heat if your skin turns bright red. This is especially important if you are unable to feel pain, heat, or cold. You have a greater risk of getting burned.  Activity    · Do not stay in bed. Staying in bed for more than 1-2 days can delay your recovery.  · Sit up and stand up straight. Avoid leaning forward when you sit or hunching over when you stand.  ? If you work at a desk, sit close to it so you do not need to lean over. Keep your chin tucked in. Keep your neck drawn back, and keep your elbows bent at a 90-degree angle (right angle).  ? Sit high and close to the steering wheel when you drive. Add lower back (lumbar) support to your car seat, if needed.  · Take short walks on even surfaces as soon as you are able. Try to increase the length of time you walk each day.  · Do not sit, drive, or  one place for more than 30 minutes at a time. Sitting or standing for long periods of time can put stress on your back.  · Do not drive or use heavy machinery while taking prescription pain medicine.  · Use proper lifting techniques. When you bend and lift, use positions that put less stress on your back:  ? Bend your knees.  ? Keep the load close to your body.  ? Avoid twisting.  · Exercise regularly as told by your health care provider. Exercising helps your back heal faster and helps prevent back injuries by keeping muscles strong and flexible.  · Work with a physical therapist to make a safe exercise program, as recommended by your health care provider. Do any exercises as told by your physical therapist.  Lifestyle  · Maintain a healthy weight. Extra weight puts stress on your back and makes it difficult to have good posture.  · Avoid activities or situations that make you feel anxious or stressed. Stress and anxiety increase muscle tension  and can make back pain worse. Learn ways to manage anxiety and stress, such as through exercise.  General instructions  · Sleep on a firm mattress in a comfortable position. Try lying on your side with your knees slightly bent. If you lie on your back, put a pillow under your knees.  · Follow your treatment plan as told by your health care provider. This may include:  ? Cognitive or behavioral therapy.  ? Acupuncture or massage therapy.  ? Meditation or yoga.  Contact a health care provider if:  · You have pain that is not relieved with rest or medicine.  · You have increasing pain going down into your legs or buttocks.  · Your pain does not improve after 2 weeks.  · You have pain at night.  · You lose weight without trying.  · You have a fever or chills.  Get help right away if:  · You develop new bowel or bladder control problems.  · You have unusual weakness or numbness in your arms or legs.  · You develop nausea or vomiting.  · You develop abdominal pain.  · You feel faint.  Summary  · Acute back pain is sudden and usually short-lived.  · Use proper lifting techniques. When you bend and lift, use positions that put less stress on your back.  · Take over-the-counter and prescription medicines and apply heat or ice as directed by your health care provider.  This information is not intended to replace advice given to you by your health care provider. Make sure you discuss any questions you have with your health care provider.  Document Revised: 12/11/2020 Document Reviewed: 08/01/2018  [employer] Patient Education © 2021 [employer]          Follow Up   Return in about 2 weeks (around 5/24/2021) for Annual physical, lab follow up.    Patient was given instructions and counseling regarding his condition or for health maintenance advice. Please see specific information pulled into the AVS if appropriate.     Leydi Kim, APRN    05/10/21

## 2021-05-10 NOTE — PATIENT INSTRUCTIONS
Get labwork done today, follow up  Get X-rays done, follow up on results   Get new shoes   Continue low back exercises, heat and ice 20 minutes at a time as needed  Consider massage   Do not take ibuprofen or aleve with Meloxicam, can take tylenol in between doses as needed   Call or message for follow up plan        Acute Back Pain, Adult  Acute back pain is sudden and usually short-lived. It is often caused by an injury to the muscles and tissues in the back. The injury may result from:  · A muscle or ligament getting overstretched or torn (strained). Ligaments are tissues that connect bones to each other. Lifting something improperly can cause a back strain.  · Wear and tear (degeneration) of the spinal disks. Spinal disks are circular tissue that provide cushioning between the bones of the spine (vertebrae).  · Twisting motions, such as while playing sports or doing yard work.  · A hit to the back.  · Arthritis.  You may have a physical exam, lab tests, and imaging tests to find the cause of your pain. Acute back pain usually goes away with rest and home care.  Follow these instructions at home:  Managing pain, stiffness, and swelling  · Treatment may include medicines for pain and inflammation that are taken by mouth or applied to the skin, prescription pain medicine, or muscle relaxants. Take over-the-counter and prescription medicines only as told by your health care provider.  · Your health care provider may recommend applying ice during the first 24-48 hours after your pain starts. To do this:  ? Put ice in a plastic bag.  ? Place a towel between your skin and the bag.  ? Leave the ice on for 20 minutes, 2-3 times a day.  · If directed, apply heat to the affected area as often as told by your health care provider. Use the heat source that your health care provider recommends, such as a moist heat pack or a heating pad.  ? Place a towel between your skin and the heat source.  ? Leave the heat on for 20-30  minutes.  ? Remove the heat if your skin turns bright red. This is especially important if you are unable to feel pain, heat, or cold. You have a greater risk of getting burned.  Activity    · Do not stay in bed. Staying in bed for more than 1-2 days can delay your recovery.  · Sit up and stand up straight. Avoid leaning forward when you sit or hunching over when you stand.  ? If you work at a desk, sit close to it so you do not need to lean over. Keep your chin tucked in. Keep your neck drawn back, and keep your elbows bent at a 90-degree angle (right angle).  ? Sit high and close to the steering wheel when you drive. Add lower back (lumbar) support to your car seat, if needed.  · Take short walks on even surfaces as soon as you are able. Try to increase the length of time you walk each day.  · Do not sit, drive, or  one place for more than 30 minutes at a time. Sitting or standing for long periods of time can put stress on your back.  · Do not drive or use heavy machinery while taking prescription pain medicine.  · Use proper lifting techniques. When you bend and lift, use positions that put less stress on your back:  ? Bend your knees.  ? Keep the load close to your body.  ? Avoid twisting.  · Exercise regularly as told by your health care provider. Exercising helps your back heal faster and helps prevent back injuries by keeping muscles strong and flexible.  · Work with a physical therapist to make a safe exercise program, as recommended by your health care provider. Do any exercises as told by your physical therapist.  Lifestyle  · Maintain a healthy weight. Extra weight puts stress on your back and makes it difficult to have good posture.  · Avoid activities or situations that make you feel anxious or stressed. Stress and anxiety increase muscle tension and can make back pain worse. Learn ways to manage anxiety and stress, such as through exercise.  General instructions  · Sleep on a firm mattress in a  comfortable position. Try lying on your side with your knees slightly bent. If you lie on your back, put a pillow under your knees.  · Follow your treatment plan as told by your health care provider. This may include:  ? Cognitive or behavioral therapy.  ? Acupuncture or massage therapy.  ? Meditation or yoga.  Contact a health care provider if:  · You have pain that is not relieved with rest or medicine.  · You have increasing pain going down into your legs or buttocks.  · Your pain does not improve after 2 weeks.  · You have pain at night.  · You lose weight without trying.  · You have a fever or chills.  Get help right away if:  · You develop new bowel or bladder control problems.  · You have unusual weakness or numbness in your arms or legs.  · You develop nausea or vomiting.  · You develop abdominal pain.  · You feel faint.  Summary  · Acute back pain is sudden and usually short-lived.  · Use proper lifting techniques. When you bend and lift, use positions that put less stress on your back.  · Take over-the-counter and prescription medicines and apply heat or ice as directed by your health care provider.  This information is not intended to replace advice given to you by your health care provider. Make sure you discuss any questions you have with your health care provider.  Document Revised: 12/11/2020 Document Reviewed: 08/01/2018  [employer] Patient Education © 2021 [employer]

## 2021-05-11 LAB
APTT SCREEN TO CONFIRM RATIO: 1.26 RATIO (ref 0–1.4)
CONFIRM APTT/NORMAL: 43.6 SEC (ref 0–55)
LA 2 SCREEN W REFLEX-IMP: NORMAL
SCREEN APTT: 35.6 SEC (ref 0–51.9)
SCREEN DRVVT: 36.8 SEC (ref 0–47)
THROMBIN TIME: 20.8 SEC (ref 0–23)

## 2021-05-13 LAB
PROT C AG ACT/NOR PPP IA: 117 % (ref 60–150)
PROT S AG ACT/NOR PPP IA: 84 % (ref 60–150)
PROT S FREE AG ACT/NOR PPP IA: 159 % (ref 57–157)

## 2021-05-27 ENCOUNTER — OFFICE VISIT (OUTPATIENT)
Dept: FAMILY MEDICINE CLINIC | Facility: CLINIC | Age: 26
End: 2021-05-27

## 2021-05-27 VITALS
HEIGHT: 70 IN | SYSTOLIC BLOOD PRESSURE: 125 MMHG | HEART RATE: 59 BPM | DIASTOLIC BLOOD PRESSURE: 80 MMHG | BODY MASS INDEX: 23.62 KG/M2 | WEIGHT: 165 LBS | TEMPERATURE: 97.2 F | OXYGEN SATURATION: 97 % | RESPIRATION RATE: 16 BRPM

## 2021-05-27 DIAGNOSIS — R76.0 LUPUS ANTICOAGULANT POSITIVE: ICD-10-CM

## 2021-05-27 DIAGNOSIS — M54.50 ACUTE BILATERAL LOW BACK PAIN WITHOUT SCIATICA: Primary | ICD-10-CM

## 2021-05-27 DIAGNOSIS — R79.89 ELEVATED LFTS: ICD-10-CM

## 2021-05-27 PROCEDURE — 99213 OFFICE O/P EST LOW 20 MIN: CPT | Performed by: NURSE PRACTITIONER

## 2021-05-27 NOTE — PROGRESS NOTES
Subjective        Price Borjas is a 25 y.o. male.     Chief Complaint   Patient presents with   • Back Pain     2 week followup       History of Present Illness  Patient is here for follow up from his back pain. He has been wearing a back brace at work and this is improving. 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 He took his meloxicam and feels this helped.     Back pain; resolved.     Recent labs has elevated LFT's- we discussed alcohol consumption avoid acetaminophen. Can continue nsaids for joint pain and headaches if needed . Instructed not to take meloxicam with nsaids. He is to drink water with nsaids.    Lupus anticoag positive. Rechecked labs and they are normal. Faxed to Dr Taylor.           The following portions of the patient's history were reviewed and updated as appropriate: allergies, current medications, past family history, past medical history, past social history, past surgical history and problem list.      Current Outpatient Medications:   •  aspirin 81 MG tablet, Take 1 tablet by mouth Daily., Disp: 90 tablet, Rfl: 4  •  fexofenadine (ALLEGRA) 180 MG tablet, Take 180 mg by mouth Daily., Disp: , Rfl:   •  meloxicam (Mobic) 7.5 MG tablet, Take 1 tablet by mouth Daily., Disp: 30 tablet, Rfl: 0  •  clobetasol prop emollient base (TEMOVATE) 0.05 % emollient cream, Apply sparingly to hands nightly for 2 weeks then stop, Disp: 60 g, Rfl: 1    Recent Results (from the past 4032 hour(s))   Lupus Anticoagulant    Collection Time: 05/10/21 10:01 AM    Specimen: Blood   Result Value Ref Range    Dilute Prothrombin Time(dPT) 43.6 0.0 - 55.0 sec    dPT Confirm Ratio 1.26 0.00 - 1.40 Ratio    Thrombin Time 20.8 0.0 - 23.0 sec    PTT Lupus Anticoagulant 35.6 0.0 - 51.9 sec    Dilute Viper Venom Time 36.8 0.0 - 47.0 sec    Lupus Anticoagulant Reflex Comment:    Comprehensive Metabolic Panel    Collection Time: 05/10/21 10:01 AM    Specimen: Blood   Result Value Ref Range    Glucose 127 (H) 65 - 99  mg/dL    BUN 16 6 - 20 mg/dL    Creatinine 1.01 0.76 - 1.27 mg/dL    Sodium 140 136 - 145 mmol/L    Potassium 3.9 3.5 - 5.2 mmol/L    Chloride 100 98 - 107 mmol/L    CO2 29.4 (H) 22.0 - 29.0 mmol/L    Calcium 9.2 8.6 - 10.5 mg/dL    Total Protein 7.0 6.0 - 8.5 g/dL    Albumin 4.80 3.50 - 5.20 g/dL    ALT (SGPT) 62 (H) 1 - 41 U/L    AST (SGOT) 47 (H) 1 - 40 U/L    Alkaline Phosphatase 55 39 - 117 U/L    Total Bilirubin 0.5 0.0 - 1.2 mg/dL    eGFR Non African Amer 90 >60 mL/min/1.73    Globulin 2.2 gm/dL    A/G Ratio 2.2 g/dL    BUN/Creatinine Ratio 15.8 7.0 - 25.0    Anion Gap 10.6 5.0 - 15.0 mmol/L   Protein C & S Antigens    Collection Time: 05/10/21 10:01 AM    Specimen: Blood   Result Value Ref Range    Protein C Antigen 117 60 - 150 %    Protein S Ag, Total 84 60 - 150 %    Protein S Ag, Free 159 (H) 57 - 157 %   CBC Auto Differential    Collection Time: 05/10/21 10:01 AM    Specimen: Blood   Result Value Ref Range    WBC 5.77 3.40 - 10.80 10*3/mm3    RBC 5.22 4.14 - 5.80 10*6/mm3    Hemoglobin 16.1 13.0 - 17.7 g/dL    Hematocrit 46.6 37.5 - 51.0 %    MCV 89.3 79.0 - 97.0 fL    MCH 30.8 26.6 - 33.0 pg    MCHC 34.5 31.5 - 35.7 g/dL    RDW 12.1 (L) 12.3 - 15.4 %    RDW-SD 39.3 37.0 - 54.0 fl    MPV 10.8 6.0 - 12.0 fL    Platelets 193 140 - 450 10*3/mm3    Neutrophil % 67.4 42.7 - 76.0 %    Lymphocyte % 19.2 (L) 19.6 - 45.3 %    Monocyte % 8.0 5.0 - 12.0 %    Eosinophil % 4.5 0.3 - 6.2 %    Basophil % 0.7 0.0 - 1.5 %    Immature Grans % 0.2 0.0 - 0.5 %    Neutrophils, Absolute 3.89 1.70 - 7.00 10*3/mm3    Lymphocytes, Absolute 1.11 0.70 - 3.10 10*3/mm3    Monocytes, Absolute 0.46 0.10 - 0.90 10*3/mm3    Eosinophils, Absolute 0.26 0.00 - 0.40 10*3/mm3    Basophils, Absolute 0.04 0.00 - 0.20 10*3/mm3    Immature Grans, Absolute 0.01 0.00 - 0.05 10*3/mm3    nRBC 0.0 0.0 - 0.2 /100 WBC         Review of Systems    Objective     /80 (BP Location: Left arm, Patient Position: Sitting, Cuff Size: Adult)   Pulse 59   " Temp 97.2 °F (36.2 °C) (Infrared)   Resp 16   Ht 177.8 cm (70\")   Wt 74.8 kg (165 lb)   SpO2 97%   BMI 23.68 kg/m²     Physical Exam  Vitals and nursing note reviewed.   Constitutional:       Appearance: Normal appearance.   HENT:      Head: Normocephalic.      Nose: Nose normal.      Mouth/Throat:      Mouth: Mucous membranes are moist.   Cardiovascular:      Rate and Rhythm: Normal rate and regular rhythm.      Pulses: Normal pulses.      Heart sounds: Normal heart sounds.   Pulmonary:      Effort: Pulmonary effort is normal.      Breath sounds: Normal breath sounds.   Abdominal:      General: Bowel sounds are normal.      Palpations: Abdomen is soft.   Musculoskeletal:         General: Normal range of motion.   Skin:     General: Skin is warm and dry.   Neurological:      General: No focal deficit present.      Mental Status: He is alert and oriented to person, place, and time.   Psychiatric:         Mood and Affect: Mood normal.         Behavior: Behavior normal.         Thought Content: Thought content normal.         Judgment: Judgment normal.         Result Review :                Assessment/Plan {CC Problem List  Visit Diagnosis  ROS  Review (Popup)  Health Maintenance  Quality  BestPractice  Medications  SmartSets  SnapShot Encounters  Media :23}   Diagnoses and all orders for this visit:    1. Acute bilateral low back pain without sciatica (Primary)  Comments:  resolved.     2. Elevated LFTs  Comments:  discussed avoiding acetaminophen. will monitor he did drink alcohol prior to labs the night before. he will limit alcohol    3. Lupus anticoagulant positive  Comments:  recent labs normal       Patient Instructions   Follow up as needed.       Follow Up   Return in about 1 year (around 5/27/2022).    Patient was given instructions and counseling regarding his condition or for health maintenance advice. Please see specific information pulled into the AVS if appropriate.     Leydi FLORENCE" Julio, APRN    05/27/21

## 2023-01-20 ENCOUNTER — TELEPHONE (OUTPATIENT)
Dept: FAMILY MEDICINE CLINIC | Facility: CLINIC | Age: 28
End: 2023-01-20
Payer: COMMERCIAL

## 2023-01-26 ENCOUNTER — LAB (OUTPATIENT)
Dept: LAB | Facility: HOSPITAL | Age: 28
End: 2023-01-26
Payer: COMMERCIAL

## 2023-01-26 ENCOUNTER — OFFICE VISIT (OUTPATIENT)
Dept: FAMILY MEDICINE CLINIC | Facility: CLINIC | Age: 28
End: 2023-01-26
Payer: COMMERCIAL

## 2023-01-26 VITALS
OXYGEN SATURATION: 98 % | TEMPERATURE: 97.8 F | WEIGHT: 187 LBS | HEART RATE: 56 BPM | SYSTOLIC BLOOD PRESSURE: 106 MMHG | BODY MASS INDEX: 26.77 KG/M2 | HEIGHT: 70 IN | DIASTOLIC BLOOD PRESSURE: 70 MMHG

## 2023-01-26 DIAGNOSIS — Z00.00 ANNUAL PHYSICAL EXAM: ICD-10-CM

## 2023-01-26 DIAGNOSIS — Z00.00 ANNUAL PHYSICAL EXAM: Primary | ICD-10-CM

## 2023-01-26 LAB
ALBUMIN SERPL-MCNC: 4.7 G/DL (ref 3.5–5.2)
ALBUMIN/GLOB SERPL: 2 G/DL
ALP SERPL-CCNC: 68 U/L (ref 39–117)
ALT SERPL W P-5'-P-CCNC: 52 U/L (ref 1–41)
ANION GAP SERPL CALCULATED.3IONS-SCNC: 4.2 MMOL/L (ref 5–15)
AST SERPL-CCNC: 39 U/L (ref 1–40)
BILIRUB SERPL-MCNC: 0.6 MG/DL (ref 0–1.2)
BUN SERPL-MCNC: 13 MG/DL (ref 6–20)
BUN/CREAT SERPL: 13 (ref 7–25)
CALCIUM SPEC-SCNC: 9.9 MG/DL (ref 8.6–10.5)
CHLORIDE SERPL-SCNC: 104 MMOL/L (ref 98–107)
CHOLEST SERPL-MCNC: 182 MG/DL (ref 0–200)
CO2 SERPL-SCNC: 28.8 MMOL/L (ref 22–29)
CREAT SERPL-MCNC: 1 MG/DL (ref 0.76–1.27)
EGFRCR SERPLBLD CKD-EPI 2021: 105.8 ML/MIN/1.73
GLOBULIN UR ELPH-MCNC: 2.3 GM/DL
GLUCOSE SERPL-MCNC: 82 MG/DL (ref 65–99)
HCV AB SER DONR QL: NORMAL
HDLC SERPL-MCNC: 58 MG/DL (ref 40–60)
LDLC SERPL CALC-MCNC: 106 MG/DL (ref 0–100)
LDLC/HDLC SERPL: 1.8 {RATIO}
POTASSIUM SERPL-SCNC: 4.1 MMOL/L (ref 3.5–5.2)
PROT SERPL-MCNC: 7 G/DL (ref 6–8.5)
SODIUM SERPL-SCNC: 137 MMOL/L (ref 136–145)
TRIGL SERPL-MCNC: 98 MG/DL (ref 0–150)
VLDLC SERPL-MCNC: 18 MG/DL (ref 5–40)

## 2023-01-26 PROCEDURE — 36415 COLL VENOUS BLD VENIPUNCTURE: CPT

## 2023-01-26 PROCEDURE — 86803 HEPATITIS C AB TEST: CPT

## 2023-01-26 PROCEDURE — 80053 COMPREHEN METABOLIC PANEL: CPT

## 2023-01-26 PROCEDURE — 99395 PREV VISIT EST AGE 18-39: CPT | Performed by: NURSE PRACTITIONER

## 2023-01-26 PROCEDURE — 80061 LIPID PANEL: CPT

## 2023-01-26 NOTE — PROGRESS NOTES
"Subjective        Price Borjas is a 27 y.o. male.     Chief Complaint   Patient presents with   • Annual Exam     physical       History of Present Illness  Patient is here for his annual physical.  No hospitalization no surgery since last seen.    The following portions of the patient's history were reviewed and updated as appropriate: allergies, current medications, past family history, past medical history, past social history, past surgical history and problem list.      Current Outpatient Medications:   •  aspirin 81 MG tablet, Take 1 tablet by mouth Daily., Disp: 90 tablet, Rfl: 4  •  fexofenadine (ALLEGRA) 180 MG tablet, Take 180 mg by mouth Daily., Disp: , Rfl:     No results found for this or any previous visit (from the past 4032 hour(s)).      Review of Systems   HENT: Negative for nosebleeds, postnasal drip and rhinorrhea.         No recent dental exams.   No dental problems   Eyes:        No glasses no contacts  Not current with eye exam.   Endocrine: Negative for cold intolerance, heat intolerance, polydipsia and polyphagia.   Genitourinary: Negative for flank pain, frequency, testicular pain and urinary incontinence.   Musculoskeletal: Positive for back pain.        Back pain lessened since he changed jobs   Skin:        No skin cancer, no poor wound healing no rashes   Allergic/Immunologic: Positive for environmental allergies.        Allergies karen eyes ususally april   Neurological: Negative for tremors, seizures and headache.   Hematological:        Taking aspirin daily for factor def.   Psychiatric/Behavioral: Negative for hallucinations, suicidal ideas, negative for hyperactivity and depressed mood.       Objective     /70   Pulse 56   Temp 97.8 °F (36.6 °C) (Infrared)   Ht 177.8 cm (70\")   Wt 84.8 kg (187 lb)   SpO2 98%   BMI 26.83 kg/m²     Physical Exam  Vitals and nursing note reviewed.   Constitutional:       Appearance: Normal appearance.   HENT:      Head: Normocephalic.      " Right Ear: External ear normal.      Left Ear: External ear normal.      Nose: Nose normal.      Mouth/Throat:      Mouth: Mucous membranes are moist.   Eyes:      Pupils: Pupils are equal, round, and reactive to light.   Cardiovascular:      Rate and Rhythm: Normal rate and regular rhythm.      Pulses: Normal pulses.      Heart sounds: Normal heart sounds.   Pulmonary:      Effort: Pulmonary effort is normal.      Breath sounds: Normal breath sounds.   Abdominal:      General: Bowel sounds are normal.      Palpations: Abdomen is soft.   Musculoskeletal:      Cervical back: Neck supple.   Skin:     General: Skin is warm.      Capillary Refill: Capillary refill takes less than 2 seconds.   Neurological:      General: No focal deficit present.      Mental Status: He is alert and oriented to person, place, and time.   Psychiatric:         Mood and Affect: Mood normal.         Behavior: Behavior normal.         Thought Content: Thought content normal.         Judgment: Judgment normal.         Result Review :                Assessment & Plan    Diagnoses and all orders for this visit:    1. Annual physical exam (Primary)  -     Lipid Panel; Future  -     Cancel: Basic Metabolic Panel; Future  -     Hepatitis C antibody; Future      Patient Instructions   Follow up on lab results  Eat healthy and exercise.   Limit alcohol.       Follow Up   Return in about 1 year (around 1/26/2024).    Patient was given instructions and counseling regarding his condition or for health maintenance advice. Please see specific information pulled into the AVS if appropriate.     Leydi Kim, APRN    01/26/23